# Patient Record
Sex: FEMALE | Race: BLACK OR AFRICAN AMERICAN | NOT HISPANIC OR LATINO | Employment: FULL TIME | URBAN - METROPOLITAN AREA
[De-identification: names, ages, dates, MRNs, and addresses within clinical notes are randomized per-mention and may not be internally consistent; named-entity substitution may affect disease eponyms.]

---

## 2021-06-01 ENCOUNTER — RECORDS - HEALTHEAST (OUTPATIENT)
Dept: ADMINISTRATIVE | Facility: CLINIC | Age: 26
End: 2021-06-01

## 2023-08-06 ENCOUNTER — APPOINTMENT (OUTPATIENT)
Dept: GENERAL RADIOLOGY | Facility: CLINIC | Age: 28
End: 2023-08-06
Attending: EMERGENCY MEDICINE
Payer: COMMERCIAL

## 2023-08-06 ENCOUNTER — HOSPITAL ENCOUNTER (EMERGENCY)
Facility: CLINIC | Age: 28
Discharge: HOME | End: 2023-08-06
Attending: EMERGENCY MEDICINE | Admitting: EMERGENCY MEDICINE
Payer: COMMERCIAL

## 2023-08-06 VITALS
RESPIRATION RATE: 18 BRPM | TEMPERATURE: 98.3 F | SYSTOLIC BLOOD PRESSURE: 140 MMHG | DIASTOLIC BLOOD PRESSURE: 82 MMHG | HEART RATE: 95 BPM | OXYGEN SATURATION: 98 %

## 2023-08-06 DIAGNOSIS — M54.9 BACK PAIN, UNSPECIFIED BACK LOCATION, UNSPECIFIED BACK PAIN LATERALITY, UNSPECIFIED CHRONICITY: ICD-10-CM

## 2023-08-06 PROCEDURE — 250N000013 HC RX MED GY IP 250 OP 250 PS 637: Performed by: EMERGENCY MEDICINE

## 2023-08-06 PROCEDURE — 99283 EMERGENCY DEPT VISIT LOW MDM: CPT

## 2023-08-06 PROCEDURE — 72040 X-RAY EXAM NECK SPINE 2-3 VW: CPT | Mod: 26 | Performed by: RADIOLOGY

## 2023-08-06 PROCEDURE — 99284 EMERGENCY DEPT VISIT MOD MDM: CPT | Performed by: EMERGENCY MEDICINE

## 2023-08-06 PROCEDURE — 72100 X-RAY EXAM L-S SPINE 2/3 VWS: CPT | Mod: 26 | Performed by: RADIOLOGY

## 2023-08-06 PROCEDURE — 72040 X-RAY EXAM NECK SPINE 2-3 VW: CPT

## 2023-08-06 PROCEDURE — 72100 X-RAY EXAM L-S SPINE 2/3 VWS: CPT

## 2023-08-06 PROCEDURE — 72070 X-RAY EXAM THORAC SPINE 2VWS: CPT | Mod: 26 | Performed by: RADIOLOGY

## 2023-08-06 PROCEDURE — 72070 X-RAY EXAM THORAC SPINE 2VWS: CPT

## 2023-08-06 RX ORDER — OXYCODONE HYDROCHLORIDE 10 MG/1
10 TABLET ORAL ONCE
Status: COMPLETED | OUTPATIENT
Start: 2023-08-06 | End: 2023-08-06

## 2023-08-06 RX ADMIN — OXYCODONE HYDROCHLORIDE 10 MG: 10 TABLET ORAL at 21:25

## 2023-08-06 ASSESSMENT — ACTIVITIES OF DAILY LIVING (ADL)
ADLS_ACUITY_SCORE: 33
ADLS_ACUITY_SCORE: 35

## 2023-08-07 NOTE — ED NOTES
Pt received discharge paperwork and script to take home, pt signed the discharge paperwork with no further questions for the RN of MD, vitals taken, pt used a wheelchair to get out of the ED with family member, pt stated they were able to get a ride to take them home

## 2023-08-07 NOTE — ED TRIAGE NOTES
Pt BIB by EMS from home for complain of lower back pain r/t to unwitnessed fall. Pt reports sliding down in bathroom trying to get up and fell. Pt reports hitting head on the right side. Denies LOC, vision changes or new HA. Denies on thinners. Rates pain 9/10.      Triage Assessment       Row Name 08/06/23 2039       Triage Assessment (Adult)    Airway WDL WDL       Respiratory WDL    Respiratory WDL WDL       Skin Circulation/Temperature WDL    Skin Circulation/Temperature WDL WDL       Cardiac WDL    Cardiac WDL WDL       Peripheral/Neurovascular WDL    Peripheral Neurovascular WDL WDL       Cognitive/Neuro/Behavioral WDL    Cognitive/Neuro/Behavioral WDL WDL

## 2023-08-07 NOTE — DISCHARGE INSTRUCTIONS
I was happy to see that your x-rays demonstrate no sign of new broken bone or fracture.  Based on the mechanism and how you fell with a twisting motion I suspect this is a strain to the musculature supporting your spine.  You may utilize ibuprofen and/or acetaminophen.  Most commonly with muscular type strains pain increases the first 12 to 24 hours then gradually decreases over 3 to 4 days.  Regarding her persistent dizziness I did have a chance to review your outpatient and inpatient work-up recently at the outside facility.  I would recommend continued close follow-up in clinic to ensure thoroughness and completion of your work-up.  Should you have change, progression or any worsening of symptoms we are happy to reevaluate you emergently at any time.

## 2023-08-07 NOTE — ED PROVIDER NOTES
ED PROVIDER NOTE  August 6, 2023  History     Chief Complaint   Patient presents with    Fall     HPI  Leah Neyda Tinoco is a 28 year old female who states the emergency department after a abdominal fall.  This patient has had recently an extensive work-up for persistent dizziness and weakness.  Today she was in her normal state of health at baseline when she was in the bathroom.  She reached out to grab on the sink but states that her weight was too much and fell to the side.  Patient reports pain predominately on the left side of her back.  This is diffuse including the musculature as well as midline including the upper thoracic mid thoracic and lumbar spine.  Patient ports no new lower extremity numbness, weakness.  Patient reports no loss of consciousness, nausea, vomiting, change in vision, difficulty speaking, unilateral change in strength or sensation of the upper extremities.  Patient does note recent hospitalization at [facility] with extensive work-up including MRIs of the spine, laboratory studies, specialty consultation with diagnosis of pseudoseizure and unknown cause for persistent dizziness.      Past Medical History  No past medical history on file.  No past surgical history on file.  omeprazole 20 MG tablet      No Known Allergies  Family History  No family history on file.  Social History          A medically appropriate review of systems was performed with pertinent positives and negatives noted in the HPI, and all other systems negative.      Physical Exam   BP: (!) 143/89  Pulse: 91  Temp: 97.5  F (36.4  C)  Resp: 20  SpO2: 97 %      Physical Exam  Vitals and nursing note reviewed.   Constitutional:       Appearance: Normal appearance. She is not ill-appearing, toxic-appearing or diaphoretic.   HENT:      Head: Normocephalic and atraumatic.   Cardiovascular:      Rate and Rhythm: Normal rate and regular rhythm.   Pulmonary:      Effort: Pulmonary effort is normal. No respiratory  distress.   Musculoskeletal:      Cervical back: Normal range of motion and neck supple. Tenderness present. No swelling, deformity or signs of trauma.      Thoracic back: Tenderness present. No swelling, deformity or lacerations.      Lumbar back: Tenderness present. No swelling, deformity or lacerations.        Back:       Comments: Diffuse pain no palpable hematoma, ecchymosis, visible signs of trauma.   Lymphadenopathy:      Cervical: No cervical adenopathy.   Skin:     General: Skin is warm.      Capillary Refill: Capillary refill takes less than 2 seconds.      Findings: No bruising.   Neurological:      General: No focal deficit present.      Mental Status: She is alert and oriented to person, place, and time.      Cranial Nerves: No cranial nerve deficit.      Motor: No weakness.   Psychiatric:         Mood and Affect: Mood normal.         Behavior: Behavior normal.         ED Course        Procedures         No results found for this or any previous visit (from the past 24 hour(s)).  Medications   oxyCODONE IR (ROXICODONE) tablet 10 mg (has no administration in time range)             Critical care was not performed.     Medical Decision Making  The patient's presentation was of moderate complexity (an acute complicated injury).    The patient's evaluation involved:  review of external note(s) from 2 sources (see separate area of note for details)  ordering and/or review of 3+ test(s) in this encounter (see separate area of note for details)    The patient's management necessitated high risk (a parenteral controlled substance).    Assessments & Plan (with Medical Decision Making)     Leah Tinoco is a 28 year old female who states the emergency department after a abdominal fall.  This patient has had recently an extensive work-up for persistent dizziness and weakness.  On arrival patient to be alert, currently afebrile and he medically stable.  GCS currently 15.  She is phonating and speaking full  sentences.  No sign of visible trauma to the head or neck.  Overall suspicion for acute intracranial pathology warranting CT imaging is very low I would favor close clinical monitoring over CT imaging.  She does have diffuse tenderness to palpation over back.  My clinical suspicion is that this favors musculoskeletal pathology such as strain with less likely be midline spinous fractures did discuss risk benefits of x-ray imaging.  These were obtained demonstrate no sign of obvious or appreciable bony pathology.  Regarding ongoing dizziness this patient did recently have extensive work-up at outside facility I would hold on repeat work-up emergently.  Patient does have close outpatient follow-up for ongoing work-up.    X-ray imaging demonstrate no obvious fracture.  I did have a low clinical suspicion warranting more advanced imaging such as MRI or CT.  Patient did have recent extensive work-up I would favor close outpatient follow-up regarding now subacute to chronic dizziness.    I have reviewed the nursing notes.    I have reviewed the findings, diagnosis, plan and need for follow up with the patient.    New Prescriptions    No medications on file       Final diagnoses:   Back pain, unspecified back location, unspecified back pain laterality, unspecified chronicity       MIGUEL ANGEL OCONNOR MD    8/6/2023   [facility] EMERGENCY DEPARTMENT     Miguel Angel Oconnor MD  08/06/23 [id number]